# Patient Record
Sex: FEMALE | Race: WHITE
[De-identification: names, ages, dates, MRNs, and addresses within clinical notes are randomized per-mention and may not be internally consistent; named-entity substitution may affect disease eponyms.]

---

## 2018-07-31 ENCOUNTER — HOSPITAL ENCOUNTER (OUTPATIENT)
Dept: HOSPITAL 47 - OR | Age: 68
LOS: 2 days | Discharge: HOME | End: 2018-08-02
Payer: MEDICARE

## 2018-07-31 VITALS — BODY MASS INDEX: 31.8 KG/M2

## 2018-07-31 DIAGNOSIS — M19.90: ICD-10-CM

## 2018-07-31 DIAGNOSIS — E11.42: ICD-10-CM

## 2018-07-31 DIAGNOSIS — Z79.82: ICD-10-CM

## 2018-07-31 DIAGNOSIS — Z79.899: ICD-10-CM

## 2018-07-31 DIAGNOSIS — Z83.3: ICD-10-CM

## 2018-07-31 DIAGNOSIS — Z88.5: ICD-10-CM

## 2018-07-31 DIAGNOSIS — I71.2: ICD-10-CM

## 2018-07-31 DIAGNOSIS — Z79.84: ICD-10-CM

## 2018-07-31 DIAGNOSIS — M65.811: ICD-10-CM

## 2018-07-31 DIAGNOSIS — K76.0: ICD-10-CM

## 2018-07-31 DIAGNOSIS — E66.9: ICD-10-CM

## 2018-07-31 DIAGNOSIS — M75.122: Primary | ICD-10-CM

## 2018-07-31 DIAGNOSIS — Z82.49: ICD-10-CM

## 2018-07-31 DIAGNOSIS — K21.9: ICD-10-CM

## 2018-07-31 DIAGNOSIS — I10: ICD-10-CM

## 2018-07-31 DIAGNOSIS — G89.29: ICD-10-CM

## 2018-07-31 DIAGNOSIS — M75.91: ICD-10-CM

## 2018-07-31 DIAGNOSIS — Z90.710: ICD-10-CM

## 2018-07-31 DIAGNOSIS — M75.42: ICD-10-CM

## 2018-07-31 DIAGNOSIS — E11.65: ICD-10-CM

## 2018-07-31 LAB
ANION GAP SERPL CALC-SCNC: 8 MMOL/L
BASOPHILS # BLD AUTO: 0.1 K/UL (ref 0–0.2)
BASOPHILS NFR BLD AUTO: 1 %
CHLORIDE SERPL-SCNC: 105 MMOL/L (ref 98–107)
CO2 SERPL-SCNC: 28 MMOL/L (ref 22–30)
EOSINOPHIL # BLD AUTO: 0.2 K/UL (ref 0–0.7)
EOSINOPHIL NFR BLD AUTO: 4 %
ERYTHROCYTE [DISTWIDTH] IN BLOOD BY AUTOMATED COUNT: 4.44 M/UL (ref 3.8–5.4)
ERYTHROCYTE [DISTWIDTH] IN BLOOD: 12.8 % (ref 11.5–15.5)
GLUCOSE BLD-MCNC: 196 MG/DL (ref 75–99)
GLUCOSE BLD-MCNC: 218 MG/DL (ref 75–99)
GLUCOSE BLD-MCNC: 244 MG/DL (ref 75–99)
GLUCOSE BLD-MCNC: 286 MG/DL (ref 75–99)
GLUCOSE BLD-MCNC: 303 MG/DL (ref 75–99)
HBA1C MFR BLD: 7.3 % (ref 4–6)
HCT VFR BLD AUTO: 39.6 % (ref 34–46)
HGB BLD-MCNC: 13.5 GM/DL (ref 11.4–16)
LYMPHOCYTES # SPEC AUTO: 1.8 K/UL (ref 1–4.8)
LYMPHOCYTES NFR SPEC AUTO: 33 %
MCH RBC QN AUTO: 30.4 PG (ref 25–35)
MCHC RBC AUTO-ENTMCNC: 34 G/DL (ref 31–37)
MCV RBC AUTO: 89.2 FL (ref 80–100)
MONOCYTES # BLD AUTO: 0.2 K/UL (ref 0–1)
MONOCYTES NFR BLD AUTO: 4 %
NEUTROPHILS # BLD AUTO: 3 K/UL (ref 1.3–7.7)
NEUTROPHILS NFR BLD AUTO: 55 %
PLATELET # BLD AUTO: 243 K/UL (ref 150–450)
POTASSIUM SERPL-SCNC: 4.3 MMOL/L (ref 3.5–5.1)
SODIUM SERPL-SCNC: 141 MMOL/L (ref 137–145)
WBC # BLD AUTO: 5.4 K/UL (ref 3.8–10.6)

## 2018-07-31 PROCEDURE — 85025 COMPLETE CBC W/AUTO DIFF WBC: CPT

## 2018-07-31 PROCEDURE — 64415 NJX AA&/STRD BRCH PLXS IMG: CPT

## 2018-07-31 PROCEDURE — 23130 ACROMP/ACROMIONECTOMY PRTL: CPT

## 2018-07-31 PROCEDURE — 83036 HEMOGLOBIN GLYCOSYLATED A1C: CPT

## 2018-07-31 PROCEDURE — 23140 REMOVAL OF BONE LESION: CPT

## 2018-07-31 PROCEDURE — 80051 ELECTROLYTE PANEL: CPT

## 2018-07-31 PROCEDURE — 23412 REPAIR ROTATOR CUFF CHRONIC: CPT

## 2018-07-31 RX ADMIN — POTASSIUM CHLORIDE SCH MLS: 14.9 INJECTION, SOLUTION INTRAVENOUS at 07:10

## 2018-07-31 RX ADMIN — INSULIN ASPART SCH UNIT: 100 INJECTION, SOLUTION INTRAVENOUS; SUBCUTANEOUS at 17:47

## 2018-07-31 RX ADMIN — LISINOPRIL SCH MG: 20 TABLET ORAL at 20:22

## 2018-07-31 RX ADMIN — CEFAZOLIN SCH GM: 10 INJECTION, POWDER, FOR SOLUTION INTRAVENOUS at 15:43

## 2018-07-31 RX ADMIN — POTASSIUM CHLORIDE SCH MLS/HR: 14.9 INJECTION, SOLUTION INTRAVENOUS at 11:46

## 2018-07-31 RX ADMIN — INSULIN ASPART SCH UNIT: 100 INJECTION, SOLUTION INTRAVENOUS; SUBCUTANEOUS at 11:46

## 2018-07-31 RX ADMIN — LORATADINE PRN MG: 10 TABLET ORAL at 19:37

## 2018-07-31 RX ADMIN — INSULIN ASPART SCH UNIT: 100 INJECTION, SOLUTION INTRAVENOUS; SUBCUTANEOUS at 20:21

## 2018-07-31 RX ADMIN — POTASSIUM CHLORIDE SCH MLS/HR: 14.9 INJECTION, SOLUTION INTRAVENOUS at 20:22

## 2018-07-31 NOTE — CONS
CONSULTATION



DATE OF CONSULTATION:

07/31/2018



REASON FOR CONSULTATION:

Medical management requested by Dr. Diaz.



CONSULTATIONS:

A pleasant 68-year-old patient of Dr. Elijah Gonzalez whose chronic stable medical

conditions include diabetes, GERD, hypertension, osteoarthritis, hepatic steatosis,

diabetic peripheral neuropathy.  The patient has undergone left shoulder surgery, is

still numb.  Left arm is in a sling and support.  Very slight nausea.  No chest pain.

Denies any cardiac history.  Sitting up on the bed.  Pain is controlled.



REVIEW OF SYSTEMS:

CONSTITUTIONAL:  None.

HEENT:  None.

RESPIRATORY:  None.

CARDIOVASCULAR:  None.

GASTROINTESTINAL:  Heartburn.

GENITOURINARY:  None.

MUSCULOSKELETAL:  Arthritic pain in many joints.

DERMATOLOGICAL:  None.

HEMATOLOGIC:  None.

LYMPHATIC:  None.

PSYCHIATRY:  None.

NEUROLOGICAL:  Numbness, tingling in the hands and feet.



PAST MEDICAL HISTORY:

Diabetes mellitus type 2, GERD, hypertension, osteoarthritis, hepatic steatosis,

diabetic peripheral neuropathy, obesity, thoracic aortic aneurysm, left rotator cuff

tear, varicose veins.



PAST SURGICAL HISTORY:

Cholecystectomy, hysterectomy, artificial left eye implant.



SOCIAL HISTORY:

Does not smoke or drink alcohol.  Lives alone.



FAMILY HISTORY:

Myocardial infarction, lung cancer.



HOME MEDICATIONS:

1. Glucophage 1000 mg b.i.d.

2. Glucotrol 20 mg b.i.d. with meals.

3. Naproxen 500 mg p.o. b.i.d. p.r.n.

4. Lopressor 25 mg p.o. daily.

5. Prinivil 20 mg p.o. b.i.d.

6. Hydrochlorothiazide 25 mg q.48 hours.

7. Aspirin 81 mg q.h.s.



ALLERGIES:

CODEINE.



EXAMINATION:

Temperature 98.1, pulse 91, respirations 16, blood pressure 120/76, pulse 91% on room

air.

GENERAL APPEARANCE:  Well-built, BMI 31.8, sitting up comfortable.

EYES:  Pupils equal.  Conjunctivae normal.

HEENT:  External appearance of ears and nose normal.  Oral cavity normal.

NECK:  JVD not raised.  Mass not palpable.

RESPIRATORY:  Effort normal.

LUNGS:  Clear.

CARDIOVASCULAR:  First and second sounds normal.  No edema.

ABDOMEN:  Soft, nontender.  Liver and spleen not palpable.

LYMPHATIC:  No lymph node palpable in neck or axillae.

PSYCHIATRY:  Alert and oriented x3.  Mood and affect normal.

NEUROLOGICAL:  Pupils equal. Cranial nerves grossly intact.  Power and sensation

grossly intact.

EXTREMITIES:  Left arm in a support.



INVESTIGATIONS:

White count 5.4, hemoglobin 13.5.  Potassium 4.3.  Accu-Cheks are noted.



ASSESSMENT:

1. Left shoulder surgery, including acromioplasty, excision distal end of clavicle and

    rotator cuff repair, left shoulder and cortisone injection.

2. Diabetes mellitus type 2 on oral hypoglycemic.

3. Gastroesophageal reflux disease.

4. Essential hypertension.

5. Primary osteoarthritis.

6. Hepatic steatosis.

7. Diabetic peripheral neuropathy.

8. Chronic aortic aneurysm, size unknown.

9. Obesity; BMI 31.8.



PLAN:

Home medications will be resumed.  Accu-Cheks will be done.  Care was discussed with

the patient.  Pain control in place.  Care was discussed with the patient.



Thank you, Dr. Diaz.





RADHA / ANDREA: [id number] / Job#: [id number]

## 2018-07-31 NOTE — P.ONQ
Anesthesiology Proc Note - PNB





- Peripheral Nerve Block Performed


  ** Left Interscalene Single


Time Out Performed: Yes


Procedure Start Time: 07:56


Indication: Acute Post-Operative Pain, Analgesia


Sedation Type: Sedate with meaningful contact maintained


Preparation: Sterile Prep


Position: Supine


Catheter: None


Needle Types: Other (see comment) (Jahaira)


Needle Size: 50mm (2")


Needle Gauge: 21


Technique: Ultrasound


Injectate: 0.5% Ropivacaine (see comment for volume) (25 cc)


Blood Aspirated: No


Pain Paresthesia on Injection Noted: No


Resistance on Injection: Normal


Events: Uneventful and Well Tolerated

## 2018-08-01 LAB
GLUCOSE BLD-MCNC: 205 MG/DL (ref 75–99)
GLUCOSE BLD-MCNC: 249 MG/DL (ref 75–99)
GLUCOSE BLD-MCNC: 261 MG/DL (ref 75–99)
GLUCOSE BLD-MCNC: 309 MG/DL (ref 75–99)

## 2018-08-01 RX ADMIN — POTASSIUM CHLORIDE SCH: 14.9 INJECTION, SOLUTION INTRAVENOUS at 08:24

## 2018-08-01 RX ADMIN — INSULIN ASPART SCH UNIT: 100 INJECTION, SOLUTION INTRAVENOUS; SUBCUTANEOUS at 21:17

## 2018-08-01 RX ADMIN — INSULIN ASPART SCH UNIT: 100 INJECTION, SOLUTION INTRAVENOUS; SUBCUTANEOUS at 18:37

## 2018-08-01 RX ADMIN — HYDROMORPHONE HYDROCHLORIDE PRN MG: 1 INJECTION, SOLUTION INTRAMUSCULAR; INTRAVENOUS; SUBCUTANEOUS at 10:02

## 2018-08-01 RX ADMIN — CEFAZOLIN SCH GM: 10 INJECTION, POWDER, FOR SOLUTION INTRAVENOUS at 00:19

## 2018-08-01 RX ADMIN — HYDROMORPHONE HYDROCHLORIDE PRN MG: 1 INJECTION, SOLUTION INTRAMUSCULAR; INTRAVENOUS; SUBCUTANEOUS at 17:15

## 2018-08-01 RX ADMIN — METOPROLOL TARTRATE SCH MG: 25 TABLET, FILM COATED ORAL at 09:49

## 2018-08-01 RX ADMIN — LISINOPRIL SCH MG: 20 TABLET ORAL at 09:49

## 2018-08-01 RX ADMIN — POTASSIUM CHLORIDE SCH: 14.9 INJECTION, SOLUTION INTRAVENOUS at 08:17

## 2018-08-01 RX ADMIN — INSULIN ASPART SCH UNIT: 100 INJECTION, SOLUTION INTRAVENOUS; SUBCUTANEOUS at 14:06

## 2018-08-01 RX ADMIN — HYDROCODONE BITARTRATE AND ACETAMINOPHEN PRN EACH: 7.5; 325 TABLET ORAL at 14:07

## 2018-08-01 RX ADMIN — POTASSIUM CHLORIDE SCH: 14.9 INJECTION, SOLUTION INTRAVENOUS at 17:14

## 2018-08-01 RX ADMIN — HYDROMORPHONE HYDROCHLORIDE PRN MG: 1 INJECTION, SOLUTION INTRAMUSCULAR; INTRAVENOUS; SUBCUTANEOUS at 06:35

## 2018-08-01 RX ADMIN — HYDROCODONE BITARTRATE AND ACETAMINOPHEN PRN EACH: 7.5; 325 TABLET ORAL at 21:19

## 2018-08-01 RX ADMIN — LORATADINE PRN MG: 10 TABLET ORAL at 17:15

## 2018-08-01 RX ADMIN — HYDROMORPHONE HYDROCHLORIDE PRN MG: 1 INJECTION, SOLUTION INTRAMUSCULAR; INTRAVENOUS; SUBCUTANEOUS at 02:31

## 2018-08-01 RX ADMIN — LISINOPRIL SCH MG: 20 TABLET ORAL at 21:18

## 2018-08-01 RX ADMIN — INSULIN ASPART SCH UNIT: 100 INJECTION, SOLUTION INTRAVENOUS; SUBCUTANEOUS at 09:50

## 2018-08-01 RX ADMIN — HYDROCODONE BITARTRATE AND ACETAMINOPHEN PRN EACH: 5; 325 TABLET ORAL at 08:20

## 2018-08-01 RX ADMIN — HYDROCODONE BITARTRATE AND ACETAMINOPHEN PRN EACH: 5; 325 TABLET ORAL at 01:46

## 2018-08-01 NOTE — OP
OPERATIVE REPORT



DATE OF SERVICE:

07/31/2018



SURGEON:

Dr Davi Diaz DO



FIRST ASSISTANT:

ANNA MARIE Head.



PREOPERATIVE DIAGNOSIS:

Torn rotator cuff with chronic impingement.



POSTOPERATIVE DIAGNOSIS:

Complete rupture of the supraspinatus and infraspinatus tendons of the left 
shoulder

with chronic impingement.



PROCEDURE PERFORMED:

Resection distal left clavicle, decompression and acromioplasty, and rotator 
cuff

repair utilizing Arthrex bioabsorbable anchor.



PROCEDURE:

Patient was taken to the operative suite and placed in supine position.  General

inhalation anesthesia was performed by Department of Anesthesiology.  The 
patient is

then positioned in a beach chair position, padded and secured on the surgical 
table.

The Betadine prep was carried out over the left shoulder.  The sterile drapes 
were

applied in the usual manner.



A minimally invasive anterolateral incision was performed.  Sharp dissection was

carried out through the subcutaneous tissues.  The acromioclavicular ligament is

identified and dissected.  The distal 1 cm of the clavicle is was excised with 
the bone saw.



The acromioclavicular ligament was identified. The inferolateral decompression 

acromioplasty was performed.  The width of the acromion was shaped with bone 
saw and bone rasp.



There is a large tear of the supra and infraspinatus tendons of the left 
shoulder.  One

arthrex suture anchor was secured into the bone in the usual manner.



The repair was carried out with one suture in running fashion.  Following 
complete

closure of the cuff tear, the area is irrigated with antibiotic solution.



Deltoid was then reviewed and reapproximated back into the acromion with #1 
Ethibond suture.  

#1 fine Quill suture was then utilized in approximation of the fascia.  
Subcutaneous

tissue approximated with 2-0 Vicryl suture and 3-0 Quill suture in a 
subcuticular fashion and the incision was

sealed with Dermabond.



Sterile dressing is applied.  Patient was placed in the abductor pillow splint 
and transferred to the recovery room in 

satisfactory postoperative condition.



GROSS PATHOLOGY:

There was a chronic tear of the left rotator cuff indwelling suture in the

infraspinatus tendon.  The tendon tear was reasonably palpable and the greater

tuberosity repaired with the bioabsorbable anchor.





MMODL / IJN: [id number] / Job#: [id number]

[facility] HospitalKRISTIN

## 2018-08-01 NOTE — PN
PROGRESS NOTE



DATE OF SERVICE:

08/01/2018



PRESENTING COMPLAINT:

Left shoulder surgery.



INTERVAL HISTORY:

Patient is status post left shoulder surgery; has significant pain.  Did tolerate some

diet.  No nausea or vomiting.  Pain medication is being adjusted by Orthopedics.  No

chest pain or shortness of breath.



REVIEW OF SYSTEMS:

Done for constitutional, cardiovascular, GI, pulmonary, musculoskeletal; relevant

findings as above.



CURRENT MEDICATIONS:

Reviewed.



PHYSICAL EXAMINATION:

Temperature 98.1, pulse 83, respiration 16, blood pressure 145/83, pulse ox 92% on room

air.

GENERAL APPEARANCE:  Lying in bed.

EYES: Pupils equal. Conjunctivae normal.

HEENT: External appearance of nose and ears normal. Oral cavity normal.

NECK: JVD not raised.  Mass not palpable.

RESPIRATORY: Effort normal. Lungs are clear.

CARDIOVASCULAR: First and second sounds normal. No edema.

ABDOMEN: Soft, non-tender. Liver and spleen not palpable.

PSYCHIATRY: Alert and oriented x3. Mood and affect normal.

EXTREMITIES:  Left arm in a sling and support.



INVESTIGATION:

Accu-Cheks are noted.



ASSESSMENT:

1. Left shoulder surgery with some significant postoperative pain.

2. Diabetes mellitus, type 2, uncontrolled from hyperglycemia.

3. Gastroesophageal reflux disease.

4. Essential hypertension.

5. Primary osteoarthritis.

6. Hepatic steatosis.

7. Diabetic peripheral neuropathy.

8. Chronic aortic aneurysm, size unknown.

9. Obesity; body mass index 31.8.



PLAN:

Continue current medication and treatment plan.  Care was discussed with the patient.





MMODL / IJN: [id number] / Job#: [id number]

## 2018-08-01 NOTE — P.DS
Providers


Expected date of discharge: 08/01/18


Attending physician: 


Davi Diaz





Consults: 





 





07/31/18 09:50


Consult Physician Routine 


   Consulting Provider: Olivier Rojas


   Consult Reason/Comments: medical management


   Do you want consulting provider notified?: Yes











Primary care physician: 


Elijah Gonzalez








- Discharge Diagnosis(es)


(1) Status post rotator cuff repair


Current Visit: Yes   Status: Acute   





(2) Rotator cuff tear arthropathy of left shoulder


Current Visit: Yes   Status: Acute   


Hospital Course: 





This is a 68-year-old female with known history of chronic impingement syndrome 

of the left shoulder.  The patient presented to the orthopedic office for 

evaluation.  After discussion and consideration patient elects to proceed with 

a rotator cuff repair.


The patient is seen preoperatively by her primary care physician and cleared 

for surgery.





Patient is admitted to observation at [facility] on 7/31/2018 for left 

shoulder rotator cuff repair with distal clavicle excision and acromioplasty.  

The procedures performed without complication or sequelae.  The patient is 

doing well postoperatively.  Labs and vital signs are stable on day of 

discharge.  However, her pain is very severe and her pain medications have been 

adjusted. 





On day of discharge patient's shoulder incision is healing well.  There is 

minimal erythema.  There is no drainage noted at this time.  There is minimal 

soft tissue swelling to the right upper extremity.  Patient has full hand, wrist

, and elbow motion without difficulty or pain.  Neurovascular status to the 

left upper extremity is intact.





Patient is discharged to home in stable condition.


Patient Condition at Discharge: Stable





Plan - Discharge Summary


Discharge Rx Participant: No


New Discharge Prescriptions: 


New


   Cephalexin [Keflex] 500 mg PO Q8HR #15 cap


   Hydrocodone/Acetaminophen [Norco 7.5-325] 1 - 2 tab PO Q4-6H PRN #60 tab


     PRN Reason: Pain


   hydrOXYzine PAMOATE [Vistaril] 25 mg PO TID PRN #60 cap


     PRN Reason: Pain


   Sennosides-Docusate Sodium [Senokot-S] 2 tab PO DAILY #30 tablet





No Action


   metFORMIN HCL [Glucophage] 1,000 mg PO BID


   glipiZIDE [Glucotrol] 20 mg PO BID-W/MEALS


   Lisinopril [Prinivil] 20 mg PO BID


   Aspirin 81 mg PO HS


   Naproxen [Naprosyn] 500 mg PO BID PRN


     PRN Reason: Pain


   Metoprolol Tartrate [Lopressor] 25 mg PO DAILY


   Hydrochlorothiazide 25 mg PO Q48H


Discharge Medication List





Aspirin 81 mg PO HS 01/28/15 [History]


Lisinopril [Prinivil] 20 mg PO BID 01/28/15 [History]


glipiZIDE [Glucotrol] 20 mg PO BID-W/MEALS 01/28/15 [History]


metFORMIN HCL [Glucophage] 1,000 mg PO BID 01/28/15 [History]


Hydrochlorothiazide 25 mg PO Q48H 07/24/18 [History]


Metoprolol Tartrate [Lopressor] 25 mg PO DAILY 07/24/18 [History]


Naproxen [Naprosyn] 500 mg PO BID PRN 07/24/18 [History]


Cephalexin [Keflex] 500 mg PO Q8HR #15 cap 08/01/18 [Rx]


Hydrocodone/Acetaminophen [Norco 7.5-325] 1 - 2 tab PO Q4-6H PRN #60 tab 08/01/ 18 [Rx]


Sennosides-Docusate Sodium [Senokot-S] 2 tab PO DAILY #30 tablet 08/01/18 [Rx]


hydrOXYzine PAMOATE [Vistaril] 25 mg PO TID PRN #60 cap 08/01/18 [Rx]








Follow up Appointment(s)/Referral(s): 


Davi Diaz DO [Doctor of Osteopathic Medicine] - 2 Weeks


Activity/Diet/Wound Care/Special Instructions: 


Keep incision clean and dry


Change dressing daily


May shower in 3 days if no drainage from incision


Keep arm sling/abductor pillow in place except when bathing


Follow up with Dr. Diaz in 2 weeks.





Call [facility] with any questions or concerns. [phone]


Discharge Disposition: HOME SELF-CARE

## 2018-08-02 VITALS
TEMPERATURE: 98.1 F | HEART RATE: 109 BPM | DIASTOLIC BLOOD PRESSURE: 85 MMHG | SYSTOLIC BLOOD PRESSURE: 150 MMHG | RESPIRATION RATE: 18 BRPM

## 2018-08-02 LAB — GLUCOSE BLD-MCNC: 199 MG/DL (ref 75–99)

## 2018-08-02 RX ADMIN — LISINOPRIL SCH MG: 20 TABLET ORAL at 08:22

## 2018-08-02 RX ADMIN — METOPROLOL TARTRATE SCH MG: 25 TABLET, FILM COATED ORAL at 08:22

## 2018-08-02 RX ADMIN — INSULIN ASPART SCH UNIT: 100 INJECTION, SOLUTION INTRAVENOUS; SUBCUTANEOUS at 07:40

## 2018-08-02 RX ADMIN — POTASSIUM CHLORIDE SCH: 14.9 INJECTION, SOLUTION INTRAVENOUS at 03:23

## 2018-08-02 RX ADMIN — HYDROCODONE BITARTRATE AND ACETAMINOPHEN PRN EACH: 7.5; 325 TABLET ORAL at 09:11

## 2018-08-02 RX ADMIN — HYDROCODONE BITARTRATE AND ACETAMINOPHEN PRN EACH: 7.5; 325 TABLET ORAL at 03:18
